# Patient Record
Sex: MALE | Race: BLACK OR AFRICAN AMERICAN
[De-identification: names, ages, dates, MRNs, and addresses within clinical notes are randomized per-mention and may not be internally consistent; named-entity substitution may affect disease eponyms.]

---

## 2021-06-20 ENCOUNTER — HOSPITAL ENCOUNTER (EMERGENCY)
Dept: HOSPITAL 75 - ER FS | Age: 31
Discharge: HOME | End: 2021-06-20
Payer: COMMERCIAL

## 2021-06-20 VITALS — BODY MASS INDEX: 22.19 KG/M2 | WEIGHT: 154.98 LBS | HEIGHT: 70 IN

## 2021-06-20 VITALS — DIASTOLIC BLOOD PRESSURE: 66 MMHG | SYSTOLIC BLOOD PRESSURE: 112 MMHG

## 2021-06-20 DIAGNOSIS — R11.2: ICD-10-CM

## 2021-06-20 DIAGNOSIS — Z87.891: ICD-10-CM

## 2021-06-20 DIAGNOSIS — J45.909: ICD-10-CM

## 2021-06-20 DIAGNOSIS — R07.89: Primary | ICD-10-CM

## 2021-06-20 DIAGNOSIS — F43.20: ICD-10-CM

## 2021-06-20 LAB
ALBUMIN SERPL-MCNC: 4.4 GM/DL (ref 3.2–4.5)
ALP SERPL-CCNC: 67 U/L (ref 40–136)
ALT SERPL-CCNC: 10 U/L (ref 0–55)
APTT BLD: 29 SEC (ref 24–35)
APTT PPP: YELLOW S
BACTERIA #/AREA URNS HPF: (no result) /HPF
BARBITURATES UR QL: NEGATIVE
BASOPHILS # BLD AUTO: 0.1 10^3/UL (ref 0–0.1)
BASOPHILS NFR BLD AUTO: 1 % (ref 0–10)
BENZODIAZ UR QL SCN: NEGATIVE
BILIRUB SERPL-MCNC: 0.2 MG/DL (ref 0.1–1)
BILIRUB UR QL STRIP: NEGATIVE
BUN/CREAT SERPL: 17
CALCIUM SERPL-MCNC: 9.3 MG/DL (ref 8.5–10.1)
CHLORIDE SERPL-SCNC: 105 MMOL/L (ref 98–107)
CO2 SERPL-SCNC: 25 MMOL/L (ref 21–32)
COCAINE UR QL: NEGATIVE
CREAT SERPL-MCNC: 0.98 MG/DL (ref 0.6–1.3)
EOSINOPHIL # BLD AUTO: 0.4 10^3/UL (ref 0–0.3)
EOSINOPHIL NFR BLD AUTO: 5 % (ref 0–10)
FIBRINOGEN PPP-MCNC: CLEAR MG/DL
GFR SERPLBLD BASED ON 1.73 SQ M-ARVRAT: > 60 ML/MIN
GLUCOSE SERPL-MCNC: 93 MG/DL (ref 70–105)
GLUCOSE UR STRIP-MCNC: NEGATIVE MG/DL
HCT VFR BLD CALC: 41 % (ref 40–54)
HGB BLD-MCNC: 14 G/DL (ref 13.3–17.7)
INR PPP: 1 (ref 0.8–1.4)
KETONES UR QL STRIP: NEGATIVE
LEUKOCYTE ESTERASE UR QL STRIP: NEGATIVE
LIPASE SERPL-CCNC: 18 U/L (ref 8–78)
LYMPHOCYTES # BLD AUTO: 2.2 X 10^3 (ref 1–4)
LYMPHOCYTES NFR BLD AUTO: 25 % (ref 12–44)
MAGNESIUM SERPL-MCNC: 1.8 MG/DL (ref 1.6–2.4)
MANUAL DIFFERENTIAL PERFORMED BLD QL: NO
MCH RBC QN AUTO: 32 PG (ref 25–34)
MCHC RBC AUTO-ENTMCNC: 34 G/DL (ref 32–36)
MCV RBC AUTO: 92 FL (ref 80–99)
METHADONE UR QL SCN: NEGATIVE
METHAMPHETAMINE SCREEN URINE S: NEGATIVE
MONOCYTES # BLD AUTO: 0.7 X 10^3 (ref 0–1)
MONOCYTES NFR BLD AUTO: 8 % (ref 0–12)
NEUTROPHILS # BLD AUTO: 5.5 X 10^3 (ref 1.8–7.8)
NEUTROPHILS NFR BLD AUTO: 62 % (ref 42–75)
NITRITE UR QL STRIP: NEGATIVE
OPIATES UR QL SCN: NEGATIVE
OXYCODONE UR QL: NEGATIVE
PH UR STRIP: 6.5 [PH] (ref 5–9)
PLATELET # BLD: 308 10^3/UL (ref 130–400)
PMV BLD AUTO: 8.4 FL (ref 7.4–10.4)
POTASSIUM SERPL-SCNC: 4 MMOL/L (ref 3.6–5)
PROPOXYPH UR QL: NEGATIVE
PROT SERPL-MCNC: 6.6 GM/DL (ref 6.4–8.2)
PROT UR QL STRIP: NEGATIVE
PROTHROMBIN TIME: 13 SEC (ref 12.2–14.7)
RBC #/AREA URNS HPF: (no result) /HPF
SODIUM SERPL-SCNC: 138 MMOL/L (ref 135–145)
SP GR UR STRIP: 1.01 (ref 1.02–1.02)
SQUAMOUS #/AREA URNS HPF: (no result) /HPF
TRICYCLICS UR QL SCN: NEGATIVE
WBC # BLD AUTO: 9 10^3/UL (ref 4.3–11)
WBC #/AREA URNS HPF: (no result) /HPF

## 2021-06-20 PROCEDURE — 93005 ELECTROCARDIOGRAM TRACING: CPT

## 2021-06-20 PROCEDURE — 83880 ASSAY OF NATRIURETIC PEPTIDE: CPT

## 2021-06-20 PROCEDURE — 81000 URINALYSIS NONAUTO W/SCOPE: CPT

## 2021-06-20 PROCEDURE — 80306 DRUG TEST PRSMV INSTRMNT: CPT

## 2021-06-20 PROCEDURE — 85610 PROTHROMBIN TIME: CPT

## 2021-06-20 PROCEDURE — 83735 ASSAY OF MAGNESIUM: CPT

## 2021-06-20 PROCEDURE — 80053 COMPREHEN METABOLIC PANEL: CPT

## 2021-06-20 PROCEDURE — 83690 ASSAY OF LIPASE: CPT

## 2021-06-20 PROCEDURE — 93041 RHYTHM ECG TRACING: CPT

## 2021-06-20 PROCEDURE — 85730 THROMBOPLASTIN TIME PARTIAL: CPT

## 2021-06-20 PROCEDURE — 36415 COLL VENOUS BLD VENIPUNCTURE: CPT

## 2021-06-20 PROCEDURE — 84484 ASSAY OF TROPONIN QUANT: CPT

## 2021-06-20 PROCEDURE — 71045 X-RAY EXAM CHEST 1 VIEW: CPT

## 2021-06-20 PROCEDURE — 99284 EMERGENCY DEPT VISIT MOD MDM: CPT

## 2021-06-20 PROCEDURE — 80320 DRUG SCREEN QUANTALCOHOLS: CPT

## 2021-06-20 PROCEDURE — 85025 COMPLETE CBC W/AUTO DIFF WBC: CPT

## 2021-06-20 NOTE — DIAGNOSTIC IMAGING REPORT
Clinical indication: Patient with complaints of vomiting and

burning in chest.



Exam: Portable chest x-ray upright view.



Comparisons: None.



Findings:



Lungs/pleura: Lungs are clear. There is no pneumothorax. There is

no pleural effusion.



Mediastinum: Unremarkable. 



Pulmonary vasculature: Unremarkable.



Heart: Unremarkable.



Bones/extrathoracic soft tissue: Unremarkable.



Impression:

There is no radiographic evidence of acute cardiopulmonary

process.



Dictated by: 



  Dictated on workstation # [id number]

## 2021-06-20 NOTE — ED GENERAL
General


Stated Complaint:  VOMITING


Source of Information:  Patient, EMS, Police


Exam Limitations:  Other (pt is poorly cooperative with exam and history)





History of Present Illness


Date Seen by Provider:  Jun 20, 2021


Time Seen by Provider:  00:13


Initial Comments


32 yo male presents with EMS and police from [facility] having complaint of 

nausea, vomiting, chest tightness and burning. Patient denies medical problems 

but initially states he has had this happen before but when asking more details 

for what happened before he then denies having symptoms like this before. He has

complaint of chest burning and feeling tight and it reproduces his symptoms with

palpation.  reports he was being processed for a traffic 

stop when he flagged for a warrant from another [facility]. The patient became 

anxious and upset and was breathing fast. He started to throw up and complain of

the chest pain so the WILDER had called his mom and asked about history for him. 

Per the WILDER report the patient's mother states he has a history of ADHD, 

Anxiety, Bipolar, Asthma but she denies he has any cardiac disease. She told the

m that this happens for him when he gets anxious and upset. Patient denies any 

drug or alcohol use. He is poorly cooperative with exam and history.


Associated Systoms:  Chest Pain; No Diaphoresis, No Fever/Chills; 

Nausea/Vomiting; No Rash, No Seizure; Shortness of Air; No Syncope, No Weakness





Allergies and Home Medications


Allergies


Coded Allergies:  


     No Known Drug Allergies (Unverified , 6/20/21)





Home Medications


Ondansetron 4 Mg Tab.rapdis, 4 MG PO Q6H PRN for NAUSEA/VOMITING


   Prescribed by: ANA LILIA PENA on 6/20/21 0111





Patient Home Medication List


Home Medication List Reviewed:  Yes





Review of Systems


Review of Systems


Constitutional:  No chills, No fever


EENTM:  no symptoms reported


Respiratory:  see HPI, short of breath


Cardiovascular:  chest pain (burning and tightness in chest )


Gastrointestinal:  No abdominal pain; nausea, vomiting


Genitourinary:  no symptoms reported


Musculoskeletal:  other (tenderness to palpation of chest wall reproduces chest 

pain)


Skin:  no symptoms reported


Psychiatric/Neurological:  Anxiety


Hematologic/Lymphatic:  Denies Blood Clots





Past Medical-Social-Family Hx


Past Med/Social Hx:  Reviewed Nursing Past Med/Soc Hx


Patient Social History


Alcohol Use:  Past History


Smoking Status:  Former Smoker (quit May 2021)





Past Medical History


Surgeries:  No


Respiratory:  Yes


Asthma


Cardiac:  No


Neurological:  No


Genitourinary:  No


Gastrointestinal:  No


Musculoskeletal:  No


Endocrine:  No


HEENT:  No


Psychosocial:  Yes


ADD/ADHD, Anxiety, Bipolar





Physical Exam


Vital Signs





Vital Signs - First Documented








 6/20/21





 00:13


 


Temp 36.1


 


Pulse 63


 


Resp 14


 


B/P (MAP) 112/64 (80)


 


Pulse Ox 99


 


O2 Delivery Room Air





Capillary Refill :


Height, Weight, BMI


Height: '"


Weight: lbs. oz. kg;  BMI


Method:


General Appearance:  WD/WN, Anxious


HEENT:  PERRL/EOMI, Pharynx Normal


Neck:  Full Range of Motion, Normal Inspection, Non Tender, Supple


Respiratory:  Lungs Clear, Normal Breath Sounds, No Accessory Muscle Use, No 

Respiratory Distress, Other (tender to palpation of chest wall and reports it 

reproduces his chest pain)


Cardiovascular:  Regular Rate, Rhythm, No Edema, No Murmur, Normal Peripheral 

Pulses


Gastrointestinal:  Normal Bowel Sounds, No Pulsatile Mass, Non Tender, Soft


Rectal:  Deferred


Extremity:  Normal Capillary Refill, Normal Inspection, Normal Range of Motion, 

No Pedal Edema


Neurologic/Psychiatric:  Alert, Oriented x3, No Motor/Sensory Deficits


Skin:  Normal Color, Warm/Dry





Progress/Results/Core Measures


Suspected Sepsis


SIRS


Temperature: 


Pulse:  


Respiratory Rate: 


 


Laboratory Tests


6/20/21 00:25: White Blood Count 9.0


Blood Pressure  / 


Mean: 


 





Laboratory Tests


6/20/21 00:25: 


Creatinine 0.98, INR Comment 1.0, Platelet Count 308, Total Bilirubin 0.2








Results/Orders


Lab Results





Laboratory Tests








Test


 6/20/21


00:25 6/20/21


00:46 Range/Units


 


 


White Blood Count


 9.0 


 


 4.3-11.0


10^3/uL


 


Red Blood Count


 4.41 


 


 4.35-5.85


10^6/uL


 


Hemoglobin 14.0   13.3-17.7  G/DL


 


Hematocrit 41   40-54  %


 


Mean Corpuscular Volume 92   80-99  FL


 


Mean Corpuscular Hemoglobin 32   25-34  PG


 


Mean Corpuscular Hemoglobin


Concent 34 


 


 32-36  G/DL





 


Red Cell Distribution Width 12.8   10.0-14.5  %


 


Platelet Count


 308 


 


 130-400


10^3/uL


 


Mean Platelet Volume 8.4   7.4-10.4  FL


 


Immature Granulocyte % (Auto) 0    %


 


Neutrophils (%) (Auto) 62   42-75  %


 


Lymphocytes (%) (Auto) 25   12-44  %


 


Monocytes (%) (Auto) 8   0-12  %


 


Eosinophils (%) (Auto) 5   0-10  %


 


Basophils (%) (Auto) 1   0-10  %


 


Neutrophils # (Auto) 5.5   1.8-7.8  X 10^3


 


Lymphocytes # (Auto) 2.2   1.0-4.0  X 10^3


 


Monocytes # (Auto) 0.7   0.0-1.0  X 10^3


 


Eosinophils # (Auto)


 0.4 H


 


 0.0-0.3


10^3/uL


 


Basophils # (Auto)


 0.1 


 


 0.0-0.1


10^3/uL


 


Immature Granulocyte # (Auto)


 0.0 


 


 0.0-0.1


10^3/uL


 


Prothrombin Time 13.0   12.2-14.7  SEC


 


INR Comment 1.0   0.8-1.4  


 


Activated Partial


Thromboplast Time 29 


 


 24-35  SEC





 


Sodium Level 138   135-145  MMOL/L


 


Potassium Level 4.0   3.6-5.0  MMOL/L


 


Chloride Level 105     MMOL/L


 


Carbon Dioxide Level 25   21-32  MMOL/L


 


Anion Gap 8   5-14  MMOL/L


 


Blood Urea Nitrogen 17   7-18  MG/DL


 


Creatinine


 0.98 


 


 0.60-1.30


MG/DL


 


Estimat Glomerular Filtration


Rate > 60 


 


  





 


BUN/Creatinine Ratio 17    


 


Glucose Level 93     MG/DL


 


Calcium Level 9.3   8.5-10.1  MG/DL


 


Corrected Calcium 9.0   8.5-10.1  MG/DL


 


Magnesium Level 1.8   1.6-2.4  MG/DL


 


Total Bilirubin 0.2   0.1-1.0  MG/DL


 


Aspartate Amino Transf


(AST/SGOT) 13 


 


 5-34  U/L





 


Alanine Aminotransferase


(ALT/SGPT) 10 


 


 0-55  U/L





 


Alkaline Phosphatase 67     U/L


 


Troponin I < 0.30   <0.30  NG/ML


 


Pro-B-Type Natriuretic Peptide 37.2   <75.0  PG/ML


 


Total Protein 6.6   6.4-8.2  GM/DL


 


Albumin 4.4   3.2-4.5  GM/DL


 


Lipase 18   8-78  U/L


 


Serum Alcohol < 10   <10  MG/DL


 


Urine Color  YELLOW   


 


Urine Clarity  CLEAR   


 


Urine pH  6.5  5-9  


 


Urine Specific Gravity  1.015 L 1.016-1.022  


 


Urine Protein  NEGATIVE  NEGATIVE  


 


Urine Glucose (UA)  NEGATIVE  NEGATIVE  


 


Urine Ketones  NEGATIVE  NEGATIVE  


 


Urine Nitrite  NEGATIVE  NEGATIVE  


 


Urine Bilirubin  NEGATIVE  NEGATIVE  


 


Urine Urobilinogen  0.2  < = 1.0  MG/DL


 


Urine Leukocyte Esterase  NEGATIVE  NEGATIVE  


 


Urine RBC (Auto)  NEGATIVE  NEGATIVE  


 


Urine RBC  0-2   /HPF


 


Urine WBC  NONE   /HPF


 


Urine Squamous Epithelial


Cells 


 2-5 


  /HPF





 


Urine Crystals  NONE   /LPF


 


Urine Bacteria  TRACE   /HPF


 


Urine Casts  NONE   /LPF


 


Urine Mucus  NEGATIVE   /LPF


 


Urine Culture Indicated  NO   


 


Urine Opiates Screen  NEGATIVE  NEGATIVE  


 


Urine Oxycodone Screen  NEGATIVE  NEGATIVE  


 


Urine Methadone Screen  NEGATIVE  NEGATIVE  


 


Urine Propoxyphene Screen  NEGATIVE  NEGATIVE  


 


Urine Barbiturates Screen  NEGATIVE  NEGATIVE  


 


Ur Tricyclic Antidepressants


Screen 


 NEGATIVE 


 NEGATIVE  





 


Urine Phencyclidine Screen  NEGATIVE  NEGATIVE  


 


Urine Amphetamines Screen  NEGATIVE  NEGATIVE  


 


Urine Methamphetamines Screen  NEGATIVE  NEGATIVE  


 


Urine Benzodiazepines Screen  NEGATIVE  NEGATIVE  


 


Urine Cocaine Screen  NEGATIVE  NEGATIVE  


 


Urine Cannabinoids Screen  POSITIVE H NEGATIVE  








My Orders





Orders - ANA LILIA PENA MD


Cbc With Automated Diff (6/20/21 00:20)


Magnesium (6/20/21 00:20)


Chest 1 View Ap/Pa Only (6/20/21 00:20)


Ekg Tracing (6/20/21 00:20)


Comprehensive Metabolic Panel (6/20/21 00:20)


Protime With Inr (6/20/21 00:20)


Partial Thromboplastin Time (6/20/21 00:20)


O2 (6/20/21 00:20)


Monitor-Rhythm Ecg Trace Only (6/20/21 00:20)


Ed Iv/Invasive Line Start (6/20/21 00:20)


Lipase (6/20/21 00:20)


Troponin I Fs (6/20/21 00:20)


Probnp Fs (6/20/21 00:20)


Alcohol (6/20/21 00:20)


Drug Screen Stat (Urine) (6/20/21 00:20)


Ua Culture If Indicated (6/20/21 00:20)


Ns Iv 1000 Ml (Sodium Chloride 0.9%) (6/20/21 00:20)


Ondansetron Injection (Zofran Injectio (6/20/21 00:20)


Pantoprazole Injection (Protonix Injecti (6/20/21 00:20)


Ketorolac Injection (Toradol Injection) (6/20/21 01:05)


Lorazepam Injection (Ativan Injection) (6/20/21 01:05)


Orphenadrine Inj (Ed Only) (Norflex Inje (6/20/21 01:05)





Vital Signs/I&O











 6/20/21 6/20/21 6/20/21





 00:13 00:38 01:26


 


Temp 36.1  


 


Pulse 63  68


 


Resp 14  19


 


B/P (MAP) 112/64 (80)  112/66


 


Pulse Ox 99  99


 


O2 Delivery Room Air Room Air Room Air





Capillary Refill :


Progress Note #1:  


Progress Note


Order labs, CXR, ECG, UA, UDS, Alcohol level, Troponin, proBNP. For his burning 

and tightness in chest with n/v give IVF for hydration, Zofran for n/v, Protonix

for burning and pain





Differential diagnosis includes malingering, myocardial infarction, anxiety, 

chest wall pain, gastritis, alcohol abuse, polysubstance abuse, pancreatitis


Progress Note #2:  


Progress Note


labs stable and do not show acute significant abnormality on CBC, Chemistry, 

Cardiac lab, alcohol, UA. UDS positive for marijuana only. CXR clear of acute 

process. ECG shows LVH with early repolarization changes but no ischemic 

changes. Pt resting comfortably in room when going to update him on results and 

findings. When awakened he states he is still having pain so will give Toradol, 

Norflex and Ativan to try and help with his chest wall pain and inflammation. He

also asked to use the bathroom so he could have a bowel movement. Allowed to use

restroom prior to discharge with the police. 





After additional medicine pt reports pain resolved prior to discharge.





ECG


Initial ECG Impression Date:  Jun 20, 2021


Initial ECG Impression Time:  00:23


Initial ECG Rate:  87


Initial ECG Rhythm:  Normal Sinus


Initial ECG Comparisson:  No Previous ECG Available


Comment


Normal sinus rhythm with a heart rate of 87 bpm.  Left-ventricular hypertrophy 

with early repolarization changes.  CO interval 160 ms.  No acute ischemic ST 

elevation.  QT interval 377 ms with a QTc interval 398 ms.  There is no prior 

tracing available for comparison.





Diagnostic Imaging





   Diagonstic Imaging:  Xray


   Plain Films/CT/US/NM/MRI:  chest


Comments


on my review of his 1 view CXR he has no acute process. No prior film for 

comparison.


   Reviewed:  Reviewed by Me





Departure


Impression





   Primary Impression:  


   Anterior chest wall pain


   Additional Impressions:  


   Nausea and vomiting in adult


   Stress and adjustment reaction


Disposition:  01 HOME, SELF-CARE


Condition:  Stable





Departure-Patient Inst.


Decision time for Depature:  01:10


Referrals:  


NO,LOCAL PHYSICIAN (PCP/Family)


Primary Care Physician


Patient Instructions:  Chest Pain, Adult ED, Nausea and Vomiting, Adult ED, 

Stress





Add. Discharge Instructions:  


Medically stable to go with law enforcement for incarceration





Stay well hydrated and get plenty of rest.





Follow up with clinic for continued symptoms or more concerns.





Follow a liquid diet for 24 hours then advance to bland foods then regular foods

as your stomach tolerates over the next 2-3 days.


Scripts


Ondansetron (Ondansetron Odt) 4 Mg Tab.rapdis


4 MG PO Q6H PRN for NAUSEA/VOMITING for 2 Days, #8 TAB 0 Refills


   Prov: ANA LILIA PENA MD         6/20/21











ANA LILIA PENA MD               Jun 20, 2021 00:32